# Patient Record
Sex: FEMALE | Race: WHITE | Employment: FULL TIME | URBAN - METROPOLITAN AREA
[De-identification: names, ages, dates, MRNs, and addresses within clinical notes are randomized per-mention and may not be internally consistent; named-entity substitution may affect disease eponyms.]

---

## 2023-10-01 ENCOUNTER — APPOINTMENT (OUTPATIENT)
Dept: RADIOLOGY | Facility: HOSPITAL | Age: 33
DRG: 101 | End: 2023-10-01
Payer: COMMERCIAL

## 2023-10-01 ENCOUNTER — HOSPITAL ENCOUNTER (INPATIENT)
Facility: HOSPITAL | Age: 33
LOS: 1 days | Discharge: HOME | DRG: 101 | End: 2023-10-02
Attending: STUDENT IN AN ORGANIZED HEALTH CARE EDUCATION/TRAINING PROGRAM | Admitting: INTERNAL MEDICINE
Payer: COMMERCIAL

## 2023-10-01 DIAGNOSIS — R56.9 SEIZURE (MULTI): Primary | ICD-10-CM

## 2023-10-01 PROBLEM — R79.89 HIGH SERUM LACTATE: Status: ACTIVE | Noted: 2023-10-01

## 2023-10-01 PROBLEM — R73.9 HYPERGLYCEMIA: Status: ACTIVE | Noted: 2023-10-01

## 2023-10-01 PROBLEM — E66.01 CLASS 3 SEVERE OBESITY WITHOUT SERIOUS COMORBIDITY WITH BODY MASS INDEX (BMI) OF 40.0 TO 44.9 IN ADULT (MULTI): Status: ACTIVE | Noted: 2023-10-01

## 2023-10-01 PROBLEM — E66.01 CLASS 3 SEVERE OBESITY WITHOUT SERIOUS COMORBIDITY WITH BODY MASS INDEX (BMI) OF 40.0 TO 44.9 IN ADULT (MULTI): Status: RESOLVED | Noted: 2023-10-01 | Resolved: 2023-10-01

## 2023-10-01 LAB
ALBUMIN SERPL BCP-MCNC: 4.2 G/DL (ref 3.4–5)
ALP SERPL-CCNC: 82 U/L (ref 33–110)
ALT SERPL W P-5'-P-CCNC: 14 U/L (ref 7–45)
AMPHETAMINES UR QL SCN: NORMAL
ANION GAP SERPL CALC-SCNC: 17 MMOL/L (ref 10–20)
AST SERPL W P-5'-P-CCNC: 13 U/L (ref 9–39)
B-HCG SERPL-ACNC: <2 MIU/ML
BARBITURATES UR QL SCN: NORMAL
BASOPHILS # BLD AUTO: 0.05 X10*3/UL (ref 0–0.1)
BASOPHILS NFR BLD AUTO: 0.7 %
BENZODIAZ UR QL SCN: NORMAL
BILIRUB SERPL-MCNC: 0.5 MG/DL (ref 0–1.2)
BUN SERPL-MCNC: 8 MG/DL (ref 6–23)
BZE UR QL SCN: NORMAL
CALCIUM SERPL-MCNC: 8.9 MG/DL (ref 8.6–10.3)
CANNABINOIDS UR QL SCN: NORMAL
CHLORIDE SERPL-SCNC: 105 MMOL/L (ref 98–107)
CO2 SERPL-SCNC: 20 MMOL/L (ref 21–32)
CREAT SERPL-MCNC: 0.92 MG/DL (ref 0.5–1.05)
EOSINOPHIL # BLD AUTO: 0.15 X10*3/UL (ref 0–0.7)
EOSINOPHIL NFR BLD AUTO: 2 %
ERYTHROCYTE [DISTWIDTH] IN BLOOD BY AUTOMATED COUNT: 15.6 % (ref 11.5–14.5)
FENTANYL+NORFENTANYL UR QL SCN: NORMAL
GFR SERPL CREATININE-BSD FRML MDRD: 86 ML/MIN/1.73M*2
GLUCOSE SERPL-MCNC: 129 MG/DL (ref 74–99)
HCG UR QL IA.RAPID: NEGATIVE
HCT VFR BLD AUTO: 42.5 % (ref 36–46)
HGB BLD-MCNC: 14.4 G/DL (ref 12–16)
IMM GRANULOCYTES # BLD AUTO: 0.02 X10*3/UL (ref 0–0.7)
IMM GRANULOCYTES NFR BLD AUTO: 0.3 % (ref 0–0.9)
LACTATE SERPL-SCNC: 0.7 MMOL/L (ref 0.4–2)
LACTATE SERPL-SCNC: 11.5 MMOL/L (ref 0.4–2)
LACTATE SERPL-SCNC: 5.9 MMOL/L (ref 0.4–2)
LYMPHOCYTES # BLD AUTO: 2.74 X10*3/UL (ref 1.2–4.8)
LYMPHOCYTES NFR BLD AUTO: 36.6 %
MCH RBC QN AUTO: 29.6 PG (ref 26–34)
MCHC RBC AUTO-ENTMCNC: 33.9 G/DL (ref 32–36)
MCV RBC AUTO: 87 FL (ref 80–100)
MONOCYTES # BLD AUTO: 0.63 X10*3/UL (ref 0.1–1)
MONOCYTES NFR BLD AUTO: 8.4 %
NEUTROPHILS # BLD AUTO: 3.89 X10*3/UL (ref 1.2–7.7)
NEUTROPHILS NFR BLD AUTO: 52 %
NRBC BLD-RTO: 0 /100 WBCS (ref 0–0)
OPIATES UR QL SCN: NORMAL
OXYCODONE+OXYMORPHONE UR QL SCN: NORMAL
PCP UR QL SCN: NORMAL
PLATELET # BLD AUTO: 283 X10*3/UL (ref 150–450)
PMV BLD AUTO: 9.7 FL (ref 7.5–11.5)
POTASSIUM SERPL-SCNC: 4 MMOL/L (ref 3.5–5.3)
PROT SERPL-MCNC: 7.4 G/DL (ref 6.4–8.2)
RBC # BLD AUTO: 4.87 X10*6/UL (ref 4–5.2)
SODIUM SERPL-SCNC: 138 MMOL/L (ref 136–145)
WBC # BLD AUTO: 7.5 X10*3/UL (ref 4.4–11.3)

## 2023-10-01 PROCEDURE — 2500000004 HC RX 250 GENERAL PHARMACY W/ HCPCS (ALT 636 FOR OP/ED): Performed by: INTERNAL MEDICINE

## 2023-10-01 PROCEDURE — 1200000002 HC GENERAL ROOM WITH TELEMETRY DAILY

## 2023-10-01 PROCEDURE — G1004 CDSM NDSC: HCPCS

## 2023-10-01 PROCEDURE — 70553 MRI BRAIN STEM W/O & W/DYE: CPT | Performed by: RADIOLOGY

## 2023-10-01 PROCEDURE — 83605 ASSAY OF LACTIC ACID: CPT | Performed by: STUDENT IN AN ORGANIZED HEALTH CARE EDUCATION/TRAINING PROGRAM

## 2023-10-01 PROCEDURE — 99285 EMERGENCY DEPT VISIT HI MDM: CPT | Performed by: STUDENT IN AN ORGANIZED HEALTH CARE EDUCATION/TRAINING PROGRAM

## 2023-10-01 PROCEDURE — 36415 COLL VENOUS BLD VENIPUNCTURE: CPT | Performed by: STUDENT IN AN ORGANIZED HEALTH CARE EDUCATION/TRAINING PROGRAM

## 2023-10-01 PROCEDURE — 2060000001 HC INTERMEDIATE ICU ROOM DAILY

## 2023-10-01 PROCEDURE — 70450 CT HEAD/BRAIN W/O DYE: CPT | Performed by: RADIOLOGY

## 2023-10-01 PROCEDURE — 84702 CHORIONIC GONADOTROPIN TEST: CPT | Performed by: STUDENT IN AN ORGANIZED HEALTH CARE EDUCATION/TRAINING PROGRAM

## 2023-10-01 PROCEDURE — 85025 COMPLETE CBC W/AUTO DIFF WBC: CPT | Performed by: STUDENT IN AN ORGANIZED HEALTH CARE EDUCATION/TRAINING PROGRAM

## 2023-10-01 PROCEDURE — 80307 DRUG TEST PRSMV CHEM ANLYZR: CPT | Performed by: STUDENT IN AN ORGANIZED HEALTH CARE EDUCATION/TRAINING PROGRAM

## 2023-10-01 PROCEDURE — 80053 COMPREHEN METABOLIC PANEL: CPT | Performed by: STUDENT IN AN ORGANIZED HEALTH CARE EDUCATION/TRAINING PROGRAM

## 2023-10-01 PROCEDURE — 2500000004 HC RX 250 GENERAL PHARMACY W/ HCPCS (ALT 636 FOR OP/ED): Performed by: STUDENT IN AN ORGANIZED HEALTH CARE EDUCATION/TRAINING PROGRAM

## 2023-10-01 PROCEDURE — 99223 1ST HOSP IP/OBS HIGH 75: CPT | Performed by: INTERNAL MEDICINE

## 2023-10-01 PROCEDURE — 96374 THER/PROPH/DIAG INJ IV PUSH: CPT

## 2023-10-01 PROCEDURE — 81025 URINE PREGNANCY TEST: CPT | Performed by: STUDENT IN AN ORGANIZED HEALTH CARE EDUCATION/TRAINING PROGRAM

## 2023-10-01 RX ORDER — DESOGESTREL AND ETHINYL ESTRADIOL 0.15-0.03
1 KIT ORAL DAILY
COMMUNITY

## 2023-10-01 RX ORDER — LEVETIRACETAM 500 MG/1
1000 TABLET ORAL 2 TIMES DAILY
Status: DISCONTINUED | OUTPATIENT
Start: 2023-10-02 | End: 2023-10-02

## 2023-10-01 RX ORDER — TALC
9 POWDER (GRAM) TOPICAL NIGHTLY PRN
Status: DISCONTINUED | OUTPATIENT
Start: 2023-10-01 | End: 2023-10-02 | Stop reason: HOSPADM

## 2023-10-01 RX ORDER — ONDANSETRON HYDROCHLORIDE 2 MG/ML
4 INJECTION, SOLUTION INTRAVENOUS EVERY 6 HOURS PRN
Status: DISCONTINUED | OUTPATIENT
Start: 2023-10-01 | End: 2023-10-02 | Stop reason: HOSPADM

## 2023-10-01 RX ORDER — SODIUM CHLORIDE 9 MG/ML
100 INJECTION, SOLUTION INTRAVENOUS CONTINUOUS
Status: DISCONTINUED | OUTPATIENT
Start: 2023-10-01 | End: 2023-10-02

## 2023-10-01 RX ORDER — ENOXAPARIN SODIUM 100 MG/ML
40 INJECTION SUBCUTANEOUS EVERY 12 HOURS SCHEDULED
Status: DISCONTINUED | OUTPATIENT
Start: 2023-10-01 | End: 2023-10-02 | Stop reason: HOSPADM

## 2023-10-01 RX ORDER — LORAZEPAM 2 MG/ML
INJECTION INTRAMUSCULAR
Status: DISPENSED
Start: 2023-10-01 | End: 2023-10-02

## 2023-10-01 RX ORDER — SODIUM CHLORIDE 0.9 % (FLUSH) 0.9 %
SYRINGE (ML) INJECTION
Status: COMPLETED
Start: 2023-10-01 | End: 2023-10-01

## 2023-10-01 RX ORDER — LEVETIRACETAM 500 MG/1
500 TABLET ORAL 2 TIMES DAILY
Status: DISCONTINUED | OUTPATIENT
Start: 2023-10-02 | End: 2023-10-01

## 2023-10-01 RX ORDER — ACETAMINOPHEN 325 MG/1
650 TABLET ORAL EVERY 4 HOURS PRN
Status: DISCONTINUED | OUTPATIENT
Start: 2023-10-01 | End: 2023-10-02 | Stop reason: HOSPADM

## 2023-10-01 RX ADMIN — SODIUM CHLORIDE 100 ML/HR: 9 INJECTION, SOLUTION INTRAVENOUS at 21:52

## 2023-10-01 RX ADMIN — Medication: at 22:00

## 2023-10-01 RX ADMIN — ENOXAPARIN SODIUM 40 MG: 40 INJECTION SUBCUTANEOUS at 21:52

## 2023-10-01 RX ADMIN — LEVETIRACETAM 3000 MG: 500 INJECTION, SOLUTION, CONCENTRATE INTRAVENOUS at 16:36

## 2023-10-01 SDOH — SOCIAL STABILITY: SOCIAL INSECURITY: DO YOU FEEL UNSAFE GOING BACK TO THE PLACE WHERE YOU ARE LIVING?: NO

## 2023-10-01 SDOH — SOCIAL STABILITY: SOCIAL INSECURITY: HAVE YOU HAD THOUGHTS OF HARMING ANYONE ELSE?: NO

## 2023-10-01 SDOH — SOCIAL STABILITY: SOCIAL INSECURITY: DOES ANYONE TRY TO KEEP YOU FROM HAVING/CONTACTING OTHER FRIENDS OR DOING THINGS OUTSIDE YOUR HOME?: NO

## 2023-10-01 SDOH — SOCIAL STABILITY: SOCIAL INSECURITY: ABUSE: ADULT

## 2023-10-01 SDOH — ECONOMIC STABILITY: TRANSPORTATION INSECURITY
IN THE PAST 12 MONTHS, HAS LACK OF TRANSPORTATION KEPT YOU FROM MEETINGS, WORK, OR FROM GETTING THINGS NEEDED FOR DAILY LIVING?: NO

## 2023-10-01 SDOH — HEALTH STABILITY: PHYSICAL HEALTH: ON AVERAGE, HOW MANY MINUTES DO YOU ENGAGE IN EXERCISE AT THIS LEVEL?: 20 MIN

## 2023-10-01 SDOH — SOCIAL STABILITY: SOCIAL INSECURITY: DO YOU FEEL ANYONE HAS EXPLOITED OR TAKEN ADVANTAGE OF YOU FINANCIALLY OR OF YOUR PERSONAL PROPERTY?: NO

## 2023-10-01 SDOH — SOCIAL STABILITY: SOCIAL INSECURITY: HAS ANYONE EVER THREATENED TO HURT YOUR FAMILY OR YOUR PETS?: NO

## 2023-10-01 SDOH — HEALTH STABILITY: PHYSICAL HEALTH: ON AVERAGE, HOW MANY DAYS PER WEEK DO YOU ENGAGE IN MODERATE TO STRENUOUS EXERCISE (LIKE A BRISK WALK)?: 3 DAYS

## 2023-10-01 SDOH — ECONOMIC STABILITY: HOUSING INSECURITY
IN THE LAST 12 MONTHS, WAS THERE A TIME WHEN YOU DID NOT HAVE A STEADY PLACE TO SLEEP OR SLEPT IN A SHELTER (INCLUDING NOW)?: NO

## 2023-10-01 SDOH — SOCIAL STABILITY: SOCIAL INSECURITY: ARE THERE ANY APPARENT SIGNS OF INJURIES/BEHAVIORS THAT COULD BE RELATED TO ABUSE/NEGLECT?: NO

## 2023-10-01 SDOH — ECONOMIC STABILITY: INCOME INSECURITY: HOW HARD IS IT FOR YOU TO PAY FOR THE VERY BASICS LIKE FOOD, HOUSING, MEDICAL CARE, AND HEATING?: PATIENT DECLINED

## 2023-10-01 SDOH — ECONOMIC STABILITY: INCOME INSECURITY: IN THE LAST 12 MONTHS, WAS THERE A TIME WHEN YOU WERE NOT ABLE TO PAY THE MORTGAGE OR RENT ON TIME?: NO

## 2023-10-01 SDOH — SOCIAL STABILITY: SOCIAL INSECURITY: WERE YOU ABLE TO COMPLETE ALL THE BEHAVIORAL HEALTH SCREENINGS?: YES

## 2023-10-01 SDOH — ECONOMIC STABILITY: TRANSPORTATION INSECURITY
IN THE PAST 12 MONTHS, HAS THE LACK OF TRANSPORTATION KEPT YOU FROM MEDICAL APPOINTMENTS OR FROM GETTING MEDICATIONS?: NO

## 2023-10-01 SDOH — SOCIAL STABILITY: SOCIAL INSECURITY: ARE YOU OR HAVE YOU BEEN THREATENED OR ABUSED PHYSICALLY, EMOTIONALLY, OR SEXUALLY BY ANYONE?: YES

## 2023-10-01 SDOH — SOCIAL STABILITY: SOCIAL INSECURITY: POSSIBLE ABUSE REPORTED TO:: OTHER (COMMENT)

## 2023-10-01 ASSESSMENT — PATIENT HEALTH QUESTIONNAIRE - PHQ9
SUM OF ALL RESPONSES TO PHQ9 QUESTIONS 1 & 2: 0
2. FEELING DOWN, DEPRESSED OR HOPELESS: NOT AT ALL
1. LITTLE INTEREST OR PLEASURE IN DOING THINGS: NOT AT ALL

## 2023-10-01 ASSESSMENT — ACTIVITIES OF DAILY LIVING (ADL)
HEARING - LEFT EAR: FUNCTIONAL
FEEDING YOURSELF: INDEPENDENT
JUDGMENT_ADEQUATE_SAFELY_COMPLETE_DAILY_ACTIVITIES: YES
ADEQUATE_TO_COMPLETE_ADL: YES
HEARING - RIGHT EAR: FUNCTIONAL
ADEQUATE_TO_COMPLETE_ADL: YES
BATHING: INDEPENDENT
GROOMING: INDEPENDENT
TOILETING: INDEPENDENT
GROOMING: INDEPENDENT
BATHING: INDEPENDENT
FEEDING YOURSELF: INDEPENDENT
PATIENT'S MEMORY ADEQUATE TO SAFELY COMPLETE DAILY ACTIVITIES?: YES
PATIENT'S MEMORY ADEQUATE TO SAFELY COMPLETE DAILY ACTIVITIES?: YES
WALKS IN HOME: INDEPENDENT
JUDGMENT_ADEQUATE_SAFELY_COMPLETE_DAILY_ACTIVITIES: YES
TOILETING: INDEPENDENT
HEARING - RIGHT EAR: FUNCTIONAL
HEARING - LEFT EAR: FUNCTIONAL
DRESSING YOURSELF: INDEPENDENT
DRESSING YOURSELF: INDEPENDENT
WALKS IN HOME: INDEPENDENT

## 2023-10-01 ASSESSMENT — COGNITIVE AND FUNCTIONAL STATUS - GENERAL
PATIENT BASELINE BEDBOUND: NO
MOBILITY SCORE: 24
DAILY ACTIVITIY SCORE: 24

## 2023-10-01 ASSESSMENT — LIFESTYLE VARIABLES
HOW OFTEN DO YOU HAVE 6 OR MORE DRINKS ON ONE OCCASION: NEVER
HOW OFTEN DO YOU HAVE A DRINK CONTAINING ALCOHOL: NEVER
HOW MANY STANDARD DRINKS CONTAINING ALCOHOL DO YOU HAVE ON A TYPICAL DAY: PATIENT DOES NOT DRINK
AUDIT-C TOTAL SCORE: 0
SKIP TO QUESTIONS 9-10: 1
PRESCIPTION_ABUSE_PAST_12_MONTHS: NO
SUBSTANCE_ABUSE_PAST_12_MONTHS: NO
AUDIT-C TOTAL SCORE: 0

## 2023-10-01 ASSESSMENT — PAIN SCALES - GENERAL
PAINLEVEL_OUTOF10: 0 - NO PAIN
PAINLEVEL_OUTOF10: 0 - NO PAIN

## 2023-10-01 ASSESSMENT — COLUMBIA-SUICIDE SEVERITY RATING SCALE - C-SSRS
6. HAVE YOU EVER DONE ANYTHING, STARTED TO DO ANYTHING, OR PREPARED TO DO ANYTHING TO END YOUR LIFE?: NO
1. IN THE PAST MONTH, HAVE YOU WISHED YOU WERE DEAD OR WISHED YOU COULD GO TO SLEEP AND NOT WAKE UP?: NO
2. HAVE YOU ACTUALLY HAD ANY THOUGHTS OF KILLING YOURSELF?: NO
1. IN THE PAST MONTH, HAVE YOU WISHED YOU WERE DEAD OR WISHED YOU COULD GO TO SLEEP AND NOT WAKE UP?: NO
6. HAVE YOU EVER DONE ANYTHING, STARTED TO DO ANYTHING, OR PREPARED TO DO ANYTHING TO END YOUR LIFE?: NO
2. HAVE YOU ACTUALLY HAD ANY THOUGHTS OF KILLING YOURSELF?: NO

## 2023-10-01 ASSESSMENT — PAIN - FUNCTIONAL ASSESSMENT
PAIN_FUNCTIONAL_ASSESSMENT: 0-10
PAIN_FUNCTIONAL_ASSESSMENT: 0-10

## 2023-10-01 NOTE — ED PROVIDER NOTES
HPI:  32-year-old female who is presenting after seizure.  Per EMS, patient's family and patient were in the car when patient suddenly had full body shaking.  She then was very confused after.  EMS noted when they arrived she was tired and responsive only to pain however they noted she slowly started to return back to her baseline over a period of 10 to 15 minutes.  Patient is currently denying any pain.  Denies any chest pain, shortness of breath, abdominal pain, nausea or vomiting.  Patient states she has been feeling at her baseline prior to the seizure.  She denies any new or abnormal symptoms.  Patient does note that she has had episodes like this in the past.  She states she was previously evaluated with an MRI and EEG and was told there was no answer as to what was causing these seizures.  Patient notes she has never been on any antiepileptics.  She denies any recent fevers or illness.    Additional History Obtained from: Family     ------------------------------------------------------------------------------------------------------------------------------------------  Physical Exam:    VS: As documented in the triage note and EMR flowsheet from this visit were reviewed.  General: Well appearing. No acute distress.   Eyes: Pupils round and reactive. No scleral icterus.   HENT: Atraumatic. Normocephalic. Moist mucous membranes.   CV: Regular rate. No pedal edema appreciated.  Resp: Clear to auscultation bilaterally. Non-labored.    GI: Soft, nontender to palpation. Nondistended.   Skin: Warm, dry, intact. No systemic rashes or lesions appreciated.  Neuro: Alert. No focal neuro deficits observed. Speech fluent. Answers questions appropriately.   Psych: Appropriate. Kempt.    ------------------------------------------------------------------------------------------------------------------------------------------    Medical Decision Making:  Patient is a 32-year-old female who is presenting after a witnessed  seizure-like activity.  Patient is mildly tachycardic but otherwise hemodynamically stable, afebrile, nontoxic-appearing on presentation.  She is currently asymptomatic and appears at her neurologic baseline.  She has no focal neurologic deficits on exam and there is low suspicion for stroke or ICH.  However patient may have intracranial mass causing her seizure.  Patient does note that she has had similar episodes in the past and it may be that she has underlying seizure disorder that has never been diagnosed.  Other considerations include infectious process however this is less likely given the patient is afebrile and denies any infectious symptoms.  She has no neck pain, fever, recent illnesses.  Lab work will be ordered as well as UA and chest x-ray.  CT head will be ordered to rule out intracranial mass.  I was subsequently notified by the nurse that the patient was actively having a seizure.  On my evaluation, patient had full body tonic-clonic movements and was biting her tongue.  She desatted to about 82% and her heart rate was 150.  Patient was unresponsive during this time.  Seizure lasted about 2 minutes and aborted on its own.  Patient was subsequently loaded with 3 g of Keppra.  After approximately 20 minutes she returned to her baseline. Patient's CBC was unremarkable.  CMP did show a mildly decreased bicarb at 21.  Lactate was 11.5 which is consistent with seizure activity.  Beta quant was negative.  CT head showed no acute findings.  Patient remained stable and Dr. Mosher from neurology was consulted who recommended admission for MRI and EEG.  Findings and plan were discussed with the patient patient questions were answered.  Patient was admitted to the hospitalist for further treatment and management.    Independent Interpretation of Studies:  I independently interpreted CT head; No acute findings    Escalation of Care:  Appropriate for admission    Discussion of Management with Other Providers:  Dr. Mosher from neurology, admitting team       Dora Bruce MD  Emergency Medicine               Dora Bruce MD  10/02/23 9433

## 2023-10-01 NOTE — Clinical Note
Is the patient on isolation?: No   Do you expect the patient to require telemetry (informational-only for bed management): Yes

## 2023-10-02 ENCOUNTER — APPOINTMENT (OUTPATIENT)
Dept: NEUROLOGY | Facility: HOSPITAL | Age: 33
DRG: 101 | End: 2023-10-02
Payer: COMMERCIAL

## 2023-10-02 ENCOUNTER — PHARMACY VISIT (OUTPATIENT)
Dept: PHARMACY | Facility: CLINIC | Age: 33
End: 2023-10-02
Payer: COMMERCIAL

## 2023-10-02 VITALS
OXYGEN SATURATION: 97 % | HEIGHT: 63 IN | SYSTOLIC BLOOD PRESSURE: 122 MMHG | HEART RATE: 74 BPM | TEMPERATURE: 97.2 F | DIASTOLIC BLOOD PRESSURE: 77 MMHG | RESPIRATION RATE: 18 BRPM | WEIGHT: 257.06 LBS | BODY MASS INDEX: 45.55 KG/M2

## 2023-10-02 LAB
ALBUMIN SERPL BCP-MCNC: 2.9 G/DL (ref 3.4–5)
ANION GAP SERPL CALC-SCNC: 9 MMOL/L (ref 10–20)
BUN SERPL-MCNC: 4 MG/DL (ref 6–23)
CALCIUM SERPL-MCNC: 7.1 MG/DL (ref 8.6–10.3)
CHLORIDE SERPL-SCNC: 114 MMOL/L (ref 98–107)
CO2 SERPL-SCNC: 21 MMOL/L (ref 21–32)
CREAT SERPL-MCNC: 0.75 MG/DL (ref 0.5–1.05)
ERYTHROCYTE [DISTWIDTH] IN BLOOD BY AUTOMATED COUNT: 15.8 % (ref 11.5–14.5)
EST. AVERAGE GLUCOSE BLD GHB EST-MCNC: 94 MG/DL
GFR SERPL CREATININE-BSD FRML MDRD: >90 ML/MIN/1.73M*2
GLUCOSE SERPL-MCNC: 78 MG/DL (ref 74–99)
HBA1C MFR BLD: 4.9 %
HCT VFR BLD AUTO: 36.6 % (ref 36–46)
HGB BLD-MCNC: 12.4 G/DL (ref 12–16)
MAGNESIUM SERPL-MCNC: 1.58 MG/DL (ref 1.6–2.4)
MCH RBC QN AUTO: 29.2 PG (ref 26–34)
MCHC RBC AUTO-ENTMCNC: 33.9 G/DL (ref 32–36)
MCV RBC AUTO: 86 FL (ref 80–100)
NRBC BLD-RTO: 0 /100 WBCS (ref 0–0)
PHOSPHATE SERPL-MCNC: 2.6 MG/DL (ref 2.5–4.9)
PLATELET # BLD AUTO: 220 X10*3/UL (ref 150–450)
PMV BLD AUTO: 9.4 FL (ref 7.5–11.5)
POTASSIUM SERPL-SCNC: 3.2 MMOL/L (ref 3.5–5.3)
RBC # BLD AUTO: 4.24 X10*6/UL (ref 4–5.2)
SODIUM SERPL-SCNC: 141 MMOL/L (ref 136–145)
WBC # BLD AUTO: 7.3 X10*3/UL (ref 4.4–11.3)

## 2023-10-02 PROCEDURE — 2500000004 HC RX 250 GENERAL PHARMACY W/ HCPCS (ALT 636 FOR OP/ED): Performed by: INTERNAL MEDICINE

## 2023-10-02 PROCEDURE — 36415 COLL VENOUS BLD VENIPUNCTURE: CPT | Performed by: INTERNAL MEDICINE

## 2023-10-02 PROCEDURE — 95819 EEG AWAKE AND ASLEEP: CPT

## 2023-10-02 PROCEDURE — 80069 RENAL FUNCTION PANEL: CPT | Performed by: INTERNAL MEDICINE

## 2023-10-02 PROCEDURE — 85027 COMPLETE CBC AUTOMATED: CPT | Performed by: INTERNAL MEDICINE

## 2023-10-02 PROCEDURE — 83735 ASSAY OF MAGNESIUM: CPT | Performed by: INTERNAL MEDICINE

## 2023-10-02 PROCEDURE — 99239 HOSP IP/OBS DSCHRG MGMT >30: CPT | Performed by: INTERNAL MEDICINE

## 2023-10-02 PROCEDURE — 83036 HEMOGLOBIN GLYCOSYLATED A1C: CPT | Performed by: INTERNAL MEDICINE

## 2023-10-02 PROCEDURE — 99223 1ST HOSP IP/OBS HIGH 75: CPT | Performed by: PSYCHIATRY & NEUROLOGY

## 2023-10-02 PROCEDURE — RXMED WILLOW AMBULATORY MEDICATION CHARGE

## 2023-10-02 PROCEDURE — 2500000001 HC RX 250 WO HCPCS SELF ADMINISTERED DRUGS (ALT 637 FOR MEDICARE OP): Performed by: INTERNAL MEDICINE

## 2023-10-02 PROCEDURE — A9575 INJ GADOTERATE MEGLUMI 0.1ML: HCPCS | Performed by: INTERNAL MEDICINE

## 2023-10-02 RX ORDER — LEVETIRACETAM 750 MG/1
750 TABLET ORAL 2 TIMES DAILY
Qty: 60 TABLET | Refills: 5 | Status: SHIPPED | OUTPATIENT
Start: 2023-10-02 | End: 2024-03-23 | Stop reason: SDUPTHER

## 2023-10-02 RX ADMIN — ENOXAPARIN SODIUM 40 MG: 40 INJECTION SUBCUTANEOUS at 09:18

## 2023-10-02 RX ADMIN — GADOTERATE MEGLUMINE 20 ML: 376.9 INJECTION INTRAVENOUS at 10:28

## 2023-10-02 RX ADMIN — LEVETIRACETAM 1000 MG: 500 TABLET, FILM COATED ORAL at 09:18

## 2023-10-02 ASSESSMENT — COGNITIVE AND FUNCTIONAL STATUS - GENERAL
MOBILITY SCORE: 24
DAILY ACTIVITIY SCORE: 24

## 2023-10-02 ASSESSMENT — PAIN SCALES - GENERAL: PAINLEVEL_OUTOF10: 0 - NO PAIN

## 2023-10-02 ASSESSMENT — PAIN - FUNCTIONAL ASSESSMENT: PAIN_FUNCTIONAL_ASSESSMENT: 0-10

## 2023-10-02 NOTE — CARE PLAN
The patient's goals for the shift include No seizure activity.    The clinical goals for the shift include No seizure activity during hospital stay    Over the shift, the patient did not make progress toward the following goals. Barriers to progression include n/a. Recommendations to address these barriers include n/a.

## 2023-10-02 NOTE — ASSESSMENT & PLAN NOTE
Lactate = 5.9 --> 11.5 --> 0.7 -->  Will continue with gentle fluids overnight and reassess in the morning

## 2023-10-02 NOTE — PROGRESS NOTES
"History Of Present Illness  Annabelle Antony is a 32 y.o. female presenting with seizure activity.    Patient reported she was a passenger in a car (her mom was driving) on 10/1/2023 to  food when she was reported to have 2 separate seizure-like activity in the car.  She had no recollection of the events.  She had loss of consciousness.  She apparently started with staring off, and had generalized shaking of the entire body.  EMS was called, patient appeared postictal, and was minimally responsive.  She was brought to [facility] ED for evaluation.  In the ED, she had another witnessed event, which was documented to be generalized tonic-clonic movements with biting of the tongue, and no incontinence.  Patient was noted to be postictal for a few minutes and was back to baseline mentation.  Patient was given levetiracetam (per documentation 3 g), and was subsequently started on levetiracetam 1000 mg twice a day.  Head CT without contrast done was unremarkable.  UDS was negative.  Lactate was elevated, which later trended down.  Patient admitted to the hospital, neurology consulted.    No known significant past medical history.  However, has had some suspected seizure-like activity in the past consisting mostly of staring off episodes when she was younger (apparently started when she was a baby).  Her  also noted that during sleep, she sometimes would have \"shaking episodes\".    She only takes Apri (birth control).  She is , but has no plans of having kids.    She thinks she was born full-term (\"was born late\").  Denies any known fetomaternal complications during pregnancy.  In school, she was \"average\" student.    She believes she previously was checked for reasons for why she injured her ankle a few years back some rest.  She believes she may have had head imaging done, but no EEG.  \"Nothing\" ever came out of that.    Nobody had previously diagnosed her as having seizure disorder.  She has never been " "on seizure medication before.    She admits to some anxiety. Pt holding a stuffed animal in bed.    Pt states nothing out of the ordinary. No new meds. She did just start working at a [employer] about 1 mo ago in [place], [place].    She denies tobacco use/alcohol use/street drug use.    Inpatient consult to Neurology  Consult performed by: Christopher Gamino MD  Consult ordered by: Agustin Manzano DO        Past Medical History  History reviewed. No pertinent past medical history.    Surgical History  Past Surgical History:   Procedure Laterality Date    ANKLE SURGERY Left     witih metal placed in it in June 2022       Social History  Social History     Tobacco Use    Smoking status: Never    Smokeless tobacco: Never   Vaping Use    Vaping Use: Never used   Substance Use Topics    Alcohol use: Never    Drug use: Never       Allergies  Patient has no known allergies.  Medications Prior to Admission   Medication Sig Dispense Refill Last Dose    desogestreL-ethinyl estradioL (Apri) 0.15-0.03 mg tablet Take 1 tablet by mouth once daily.          Review of Systems   Constitutional:  Negative for appetite change, chills and fever.   HENT:  Negative for ear pain and nosebleeds.    Eyes:  Negative for discharge and itching.   Respiratory:  Negative for choking and chest tightness.    Cardiovascular:  Negative for chest pain and palpitations.   Gastrointestinal:  Negative for abdominal distention, abdominal pain and nausea.   Endocrine: Negative for cold intolerance and heat intolerance.   Genitourinary:  Negative for difficulty urinating and dysuria.   Musculoskeletal:  Negative for gait problem and myalgias.   Neurological:  Negative for dizziness and numbness.     Last Recorded Vitals  Blood pressure 112/73, pulse 77, temperature 36.8 °C (98.2 °F), temperature source Temporal, resp. rate 18, height 1.6 m (5' 2.99\"), weight 117 kg (257 lb 0.9 oz), last menstrual period 09/13/2023, SpO2 95 %.    Awake, alert, oriented x3, in no " distress  Well-nourished, in bed  No leg edema    Supple neck    Mental status exam as above, conversant   Fair fund of knowledge  Recent/remote memory fair  Fair attention span  Pupils round reactive to light, 3-4 mm, (-) RAPD   Fundoscopic examination was attempted but fundus was not visualized bilaterally   Full EOMs intact, no nystagmus, no ptosis   V1 to V3 sensation is intact   No facial droop   Hearing grossly intact   No dysarthria  Good shoulder shrug bilaterally   Tongue is midline     Motor strength is 5/5 on all extremities, tone/bulk normal   Reflexes trace-1+ on all 4 extremities, downgoing toes bilaterally   Sensation is intact to light touch, vibration on all 4 extremities   Finger to nose test intact bilaterally   I did not have her stand or walk    Relevant Results            Scheduled medications  enoxaparin, 40 mg, subcutaneous, q12h RONALD  levETIRAcetam, 1,000 mg, oral, BID      Continuous medications  sodium chloride 0.9%, 100 mL/hr, Last Rate: 100 mL/hr (10/02/23 1022)      PRN medications  PRN medications: acetaminophen, melatonin, ondansetron    Results for orders placed or performed during the hospital encounter of 10/01/23 (from the past 24 hour(s))   CBC and Auto Differential   Result Value Ref Range    WBC 7.5 4.4 - 11.3 x10*3/uL    nRBC 0.0 0.0 - 0.0 /100 WBCs    RBC 4.87 4.00 - 5.20 x10*6/uL    Hemoglobin 14.4 12.0 - 16.0 g/dL    Hematocrit 42.5 36.0 - 46.0 %    MCV 87 80 - 100 fL    MCH 29.6 26.0 - 34.0 pg    MCHC 33.9 32.0 - 36.0 g/dL    RDW 15.6 (H) 11.5 - 14.5 %    Platelets 283 150 - 450 x10*3/uL    MPV 9.7 7.5 - 11.5 fL    Neutrophils % 52.0 40.0 - 80.0 %    Immature Granulocytes %, Automated 0.3 0.0 - 0.9 %    Lymphocytes % 36.6 13.0 - 44.0 %    Monocytes % 8.4 2.0 - 10.0 %    Eosinophils % 2.0 0.0 - 6.0 %    Basophils % 0.7 0.0 - 2.0 %    Neutrophils Absolute 3.89 1.20 - 7.70 x10*3/uL    Immature Granulocytes Absolute, Automated 0.02 0.00 - 0.70 x10*3/uL    Lymphocytes Absolute  2.74 1.20 - 4.80 x10*3/uL    Monocytes Absolute 0.63 0.10 - 1.00 x10*3/uL    Eosinophils Absolute 0.15 0.00 - 0.70 x10*3/uL    Basophils Absolute 0.05 0.00 - 0.10 x10*3/uL   Comprehensive metabolic panel   Result Value Ref Range    Glucose 129 (H) 74 - 99 mg/dL    Sodium 138 136 - 145 mmol/L    Potassium 4.0 3.5 - 5.3 mmol/L    Chloride 105 98 - 107 mmol/L    Bicarbonate 20 (L) 21 - 32 mmol/L    Anion Gap 17 10 - 20 mmol/L    Urea Nitrogen 8 6 - 23 mg/dL    Creatinine 0.92 0.50 - 1.05 mg/dL    eGFR 86 >60 mL/min/1.73m*2    Calcium 8.9 8.6 - 10.3 mg/dL    Albumin 4.2 3.4 - 5.0 g/dL    Alkaline Phosphatase 82 33 - 110 U/L    Total Protein 7.4 6.4 - 8.2 g/dL    AST 13 9 - 39 U/L    Bilirubin, Total 0.5 0.0 - 1.2 mg/dL    ALT 14 7 - 45 U/L   Lactate   Result Value Ref Range    Lactate 5.9 (HH) 0.4 - 2.0 mmol/L   hCG, quantitative, pregnancy   Result Value Ref Range    HCG, Beta-Quantitative <2 <5 mIU/mL   Lactate   Result Value Ref Range    Lactate 11.5 (HH) 0.4 - 2.0 mmol/L   hCG, Urine, Qualitative   Result Value Ref Range    HCG, Urine NEGATIVE NEGATIVE   Drug Screen, Urine   Result Value Ref Range    Amphetamine Screen, Urine Presumptive Negative Presumptive Negative    Barbiturate Screen, Urine Presumptive Negative Presumptive Negative    Benzodiazepines Screen, Urine Presumptive Negative Presumptive Negative    Cannabinoid Screen, Urine Presumptive Negative Presumptive Negative    Cocaine Metabolite Screen, Urine Presumptive Negative Presumptive Negative    Fentanyl Screen, Urine Presumptive Negative Presumptive Negative    Opiate Screen, Urine Presumptive Negative Presumptive Negative    Oxycodone Screen, Urine Presumptive Negative Presumptive Negative    PCP Screen, Urine Presumptive Negative Presumptive Negative   Lactate   Result Value Ref Range    Lactate 0.7 0.4 - 2.0 mmol/L   CBC   Result Value Ref Range    WBC 7.3 4.4 - 11.3 x10*3/uL    nRBC 0.0 0.0 - 0.0 /100 WBCs    RBC 4.24 4.00 - 5.20 x10*6/uL     Hemoglobin 12.4 12.0 - 16.0 g/dL    Hematocrit 36.6 36.0 - 46.0 %    MCV 86 80 - 100 fL    MCH 29.2 26.0 - 34.0 pg    MCHC 33.9 32.0 - 36.0 g/dL    RDW 15.8 (H) 11.5 - 14.5 %    Platelets 220 150 - 450 x10*3/uL    MPV 9.4 7.5 - 11.5 fL   Renal function panel   Result Value Ref Range    Glucose 78 74 - 99 mg/dL    Sodium 141 136 - 145 mmol/L    Potassium 3.2 (L) 3.5 - 5.3 mmol/L    Chloride 114 (H) 98 - 107 mmol/L    Bicarbonate 21 21 - 32 mmol/L    Anion Gap 9 (L) 10 - 20 mmol/L    Urea Nitrogen 4 (L) 6 - 23 mg/dL    Creatinine 0.75 0.50 - 1.05 mg/dL    eGFR >90 >60 mL/min/1.73m*2    Calcium 7.1 (L) 8.6 - 10.3 mg/dL    Phosphorus 2.6 2.5 - 4.9 mg/dL    Albumin 2.9 (L) 3.4 - 5.0 g/dL   Magnesium   Result Value Ref Range    Magnesium 1.58 (L) 1.60 - 2.40 mg/dL   Hemoglobin A1c   Result Value Ref Range    Hemoglobin A1C 4.9 see below %    Estimated Average Glucose 94 Not Established mg/dL     MR brain w and wo IV contrast    Result Date: 10/2/2023  Interpreted By:  Harsha Ann, STUDY: MR BRAIN W AND WO IV CONTRAST;  10/2/2023 10:54 am   INDICATION: Signs/Symptoms:Seizure.   COMPARISON: None.   ACCESSION NUMBER(S): [id number]   ORDERING CLINICIAN: BO KENNEY   TECHNIQUE: The brain was studied in the sagittal, axial and coronal planes utilizing FLAIR, T1 and T2 weighted images.    Following intravenous injection of gadolinium contrast, T1 weighted fat suppressed multiplanar images were also performed. Postcontrast enhanced images are limited due to motion artifact.   FINDINGS: There is a normal-size ventricular system.  There is no evidence of intracranial mass or extra-axial collection.  The skull base, paranasal sinuses and orbital structures are unremarkable. Diffusion weighted images and associated ADC maps of the brain were unremarkable.  There is no evidence of diffusion restriction to suggest the presence of acute infarction. Gradient echo T2 weighted images fail to demonstrate hemosiderin deposition or  other evidence of hemorrhage. Following intravenous injection of there is no abnormal enhancement. There is normal contrast opacification of the dural venous sinuses.  Thin section sagittal and coronal T1 weighted images of the brain were also performed with 3-D reconstruction in multiple planes.  There is no evidence of neuronal migrational abnormality or heterotopic gray matter.  The temporal horns and temporal lobe gray matter are unremarkable.       * There is no evidence of mass, infarction or hemorrhage.   THIS EXAMINATION WAS INTERPRETED AT [facility]   Signed by: Harsha Ann 10/2/2023 11:38 AM Dictation workstation:   [id number]    CT head wo IV contrast    Result Date: 10/1/2023  Interpreted By:  Troy Frank, STUDY: CT HEAD WO IV CONTRAST;  10/1/2023 5:57 pm   INDICATION: seizures.   COMPARISON: None..   ACCESSION NUMBER(S): [id number]   ORDERING CLINICIAN: ONEYDA RIOS   TECHNIQUE: CT axial images through the Brain were obtained without contrast.   FINDINGS: There is no mass effect, hemorrhage, or infarct. The ventricles appear normal. Gray-white differentiation is maintained. The visualized paranasal sinuses appear clear.       No acute intracranial abnormality.   MACRO: None.   Signed by: Troy Frank 10/1/2023 6:53 PM Dictation workstation:   [id number]                              I have personally reviewed the following imaging results MR brain w and wo IV contrast    Result Date: 10/2/2023  Interpreted By:  Harsha Ann, STUDY: MR BRAIN W AND WO IV CONTRAST;  10/2/2023 10:54 am   INDICATION: Signs/Symptoms:Seizure.   COMPARISON: None.   ACCESSION NUMBER(S): [id number]   ORDERING CLINICIAN: BO KENNEY   TECHNIQUE: The brain was studied in the sagittal, axial and coronal planes utilizing FLAIR, T1 and T2 weighted images.    Following intravenous injection of gadolinium contrast, T1 weighted fat suppressed multiplanar images were also performed. Postcontrast enhanced images are limited due  to motion artifact.   FINDINGS: There is a normal-size ventricular system.  There is no evidence of intracranial mass or extra-axial collection.  The skull base, paranasal sinuses and orbital structures are unremarkable. Diffusion weighted images and associated ADC maps of the brain were unremarkable.  There is no evidence of diffusion restriction to suggest the presence of acute infarction. Gradient echo T2 weighted images fail to demonstrate hemosiderin deposition or other evidence of hemorrhage. Following intravenous injection of there is no abnormal enhancement. There is normal contrast opacification of the dural venous sinuses.  Thin section sagittal and coronal T1 weighted images of the brain were also performed with 3-D reconstruction in multiple planes.  There is no evidence of neuronal migrational abnormality or heterotopic gray matter.  The temporal horns and temporal lobe gray matter are unremarkable.       * There is no evidence of mass, infarction or hemorrhage.   THIS EXAMINATION WAS INTERPRETED AT [facility]   Signed by: Harsha Ann 10/2/2023 11:38 AM Dictation workstation:   [id number]    CT head wo IV contrast    Result Date: 10/1/2023  Interpreted By:  Troy Frank, STUDY: CT HEAD WO IV CONTRAST;  10/1/2023 5:57 pm   INDICATION: seizures.   COMPARISON: None..   ACCESSION NUMBER(S): [id number]   ORDERING CLINICIAN: ONEYDA RIOS   TECHNIQUE: CT axial images through the Brain were obtained without contrast.   FINDINGS: There is no mass effect, hemorrhage, or infarct. The ventricles appear normal. Gray-white differentiation is maintained. The visualized paranasal sinuses appear clear.       No acute intracranial abnormality.   MACRO: None.   Signed by: Troy Frank 10/1/2023 6:53 PM Dictation workstation:   [id number]  .     Assessment/Plan   Principal Problem:    Seizure (CMS/[facility])      Plans:  Do EEG  Seizure precautions  Change levetiracetam dose to 750mg bid  Discussed no driving x6 mo from last  sz  Discussed, pt to discuss with workplace about new diagnosis if she wants     All questions answered. Please call with questions.    Christopher Gamino MD  10/2/2023  1:21 PM

## 2023-10-02 NOTE — ASSESSMENT & PLAN NOTE
New-onset convulsive seizure activity x3 10/1/23  With prior suspected reported staring episodes since a child  Discussed possible new diagnosis of underlying seizure disorder (possibly generalized)  MRI brain normal

## 2023-10-02 NOTE — SIGNIFICANT EVENT
EEG Final Interpretation 10/2/23    This is a normal awake and sleep EEG. No epileptiform abnormalities or lateralizing signs noted.

## 2023-10-02 NOTE — H&P
"History Of Present Illness  Annabelle Sherman Friend is a 32 y.o. male with no significant past medical history.  She had been noted patient does not have any personal or family history of seizure disorder or other neurological dysfunction.  She denies any history of traumatic events to her head.  She states that her mother had told her once that she would have staring off spells as a baby but this was very infrequent and was not worked up as a child.  Patient states that about 5 years ago her  had noted she would also still have staring off spells or what they thought were \"nightmares\" as this would occur only in the evening.  About 1 year ago when the patient fell and twisted her ankle she had had an episode of staring off when she tried to get up and a shaking episode where she would shake all over but came to very quickly which she states that she did not feel was worked up but is unsure and this was done [facility] she thinks.    Patient presented to the ED after a witnessed seizure events by family.  Per the ED physician family had stated that she had suddenly become unresponsive and was staring off at which point she fell to the ground and had generalized shaking of her entire body that had lasted maybe a few minutes but it is unsure exactly how long.  Family had called EMS but noted that the patient was postictal and minimally responsive but able to protect her airway and upon arrival to the ED she was noted to be back to baseline.  She did have a second witnessed event in the ED per the ED physician where she had generalized tonic-clonic movements as well as biting of the tongue but they did not note any loss of bowel/bladder control.  Patient was only postictal for a few minutes and then was back to her baseline mentation.  Patient states that she did not know she had a second event in the ED.  She denies any recent sick contacts, chemical/environmental exposures, changes in dietary habits or any recent " traumatic events/falls.  She denies any fevers, chills, night sweats, vision changes, auditory changes, change in taste/smell, loss of bowel/bladder control, mass, vertigo, chest pain, palpitations, shortness of breath, cough, wheezing, congestion, hemoptysis, hematemesis, abdominal pain, nausea, vomiting, diarrhea, constipation, dysuria, hematuria, dyschezia, hematochezia or any lateralizing motor/sensory deficits other than noted above.    ED course:  1.  Vital signs on presentation: Temperature of 36.7 °C, heart rate of 97, respirations 16, blood pressure 136/86, pulse ox of 99% on room air  2.  CBC was unremarkable for any leukocytosis/anemia  3.  Glucose of 129  4.  Serum beta-hCG of less than 2  5.  Initial lactate of 5.9 with a repeat of 11.5 repeat of 0.7  6.  BUNs/creatinine of 8/0.92  7.  Sodium of 138, potassium of 4  8.  Urine drug screen was unremarkable  9.  CT head without contrast was unremarkable for any acute intracranial processes    A 12 point ROS was performed with the patient denying any complaints at this time aside from those listed in the HPI above.      Surgical History  She has a past surgical history that includes Ankle surgery (Left).     Social History  She reports that she has never smoked. She has never used smokeless tobacco. She reports that she does not drink alcohol and does not use drugs.    Family History  No family history on file.     Allergies  Patient has no known allergies.    Physical Exam  Constitutional:       Appearance: Normal appearance. She is obese.   HENT:      Head: Normocephalic and atraumatic.      Mouth/Throat:      Mouth: Mucous membranes are moist.   Eyes:      Extraocular Movements: Extraocular movements intact.      Conjunctiva/sclera: Conjunctivae normal.      Pupils: Pupils are equal, round, and reactive to light.   Neck:      Vascular: No carotid bruit.   Cardiovascular:      Rate and Rhythm: Normal rate and regular rhythm.      Pulses: Normal pulses.       Heart sounds: Normal heart sounds. No murmur heard.  Pulmonary:      Effort: Pulmonary effort is normal. No respiratory distress.      Breath sounds: Normal breath sounds. No wheezing or rhonchi.   Chest:      Chest wall: No tenderness.   Abdominal:      General: Bowel sounds are normal. There is no distension.      Palpations: Abdomen is soft. There is no mass.      Tenderness: There is no abdominal tenderness. There is no right CVA tenderness, left CVA tenderness, guarding or rebound.   Musculoskeletal:         General: No swelling, deformity or signs of injury. Normal range of motion.      Cervical back: Normal range of motion and neck supple. No rigidity or tenderness.   Skin:     General: Skin is warm and dry.      Capillary Refill: Capillary refill takes less than 2 seconds.   Neurological:      General: No focal deficit present.      Mental Status: She is alert and oriented to person, place, and time.      Cranial Nerves: No cranial nerve deficit.      Sensory: No sensory deficit.      Motor: No weakness.      Gait: Gait normal.   Psychiatric:         Mood and Affect: Mood normal.         Behavior: Behavior normal.         Thought Content: Thought content normal.         Judgment: Judgment normal.     Last Recorded Vitals  /80 (BP Location: Right arm)   Pulse 87   Temp 36.5 °C (97.7 °F) (Axillary)   Resp 18   Wt 117 kg (257 lb 0.9 oz)   SpO2 97%     Relevant Results  Results for orders placed or performed during the hospital encounter of 10/01/23 (from the past 24 hour(s))   CBC and Auto Differential   Result Value Ref Range    WBC 7.5 4.4 - 11.3 x10*3/uL    nRBC 0.0 0.0 - 0.0 /100 WBCs    RBC 4.87 4.00 - 5.20 x10*6/uL    Hemoglobin 14.4 12.0 - 16.0 g/dL    Hematocrit 42.5 36.0 - 46.0 %    MCV 87 80 - 100 fL    MCH 29.6 26.0 - 34.0 pg    MCHC 33.9 32.0 - 36.0 g/dL    RDW 15.6 (H) 11.5 - 14.5 %    Platelets 283 150 - 450 x10*3/uL    MPV 9.7 7.5 - 11.5 fL    Neutrophils % 52.0 40.0 - 80.0 %     Immature Granulocytes %, Automated 0.3 0.0 - 0.9 %    Lymphocytes % 36.6 13.0 - 44.0 %    Monocytes % 8.4 2.0 - 10.0 %    Eosinophils % 2.0 0.0 - 6.0 %    Basophils % 0.7 0.0 - 2.0 %    Neutrophils Absolute 3.89 1.20 - 7.70 x10*3/uL    Immature Granulocytes Absolute, Automated 0.02 0.00 - 0.70 x10*3/uL    Lymphocytes Absolute 2.74 1.20 - 4.80 x10*3/uL    Monocytes Absolute 0.63 0.10 - 1.00 x10*3/uL    Eosinophils Absolute 0.15 0.00 - 0.70 x10*3/uL    Basophils Absolute 0.05 0.00 - 0.10 x10*3/uL   Comprehensive metabolic panel   Result Value Ref Range    Glucose 129 (H) 74 - 99 mg/dL    Sodium 138 136 - 145 mmol/L    Potassium 4.0 3.5 - 5.3 mmol/L    Chloride 105 98 - 107 mmol/L    Bicarbonate 20 (L) 21 - 32 mmol/L    Anion Gap 17 10 - 20 mmol/L    Urea Nitrogen 8 6 - 23 mg/dL    Creatinine 0.92 0.50 - 1.05 mg/dL    eGFR 86 >60 mL/min/1.73m*2    Calcium 8.9 8.6 - 10.3 mg/dL    Albumin 4.2 3.4 - 5.0 g/dL    Alkaline Phosphatase 82 33 - 110 U/L    Total Protein 7.4 6.4 - 8.2 g/dL    AST 13 9 - 39 U/L    Bilirubin, Total 0.5 0.0 - 1.2 mg/dL    ALT 14 7 - 45 U/L   Lactate   Result Value Ref Range    Lactate 5.9 (HH) 0.4 - 2.0 mmol/L   hCG, quantitative, pregnancy   Result Value Ref Range    HCG, Beta-Quantitative <2 <5 mIU/mL   Lactate   Result Value Ref Range    Lactate 11.5 (HH) 0.4 - 2.0 mmol/L   hCG, Urine, Qualitative   Result Value Ref Range    HCG, Urine NEGATIVE NEGATIVE   Drug Screen, Urine   Result Value Ref Range    Amphetamine Screen, Urine Presumptive Negative Presumptive Negative    Barbiturate Screen, Urine Presumptive Negative Presumptive Negative    Benzodiazepines Screen, Urine Presumptive Negative Presumptive Negative    Cannabinoid Screen, Urine Presumptive Negative Presumptive Negative    Cocaine Metabolite Screen, Urine Presumptive Negative Presumptive Negative    Fentanyl Screen, Urine Presumptive Negative Presumptive Negative    Opiate Screen, Urine Presumptive Negative Presumptive Negative     Oxycodone Screen, Urine Presumptive Negative Presumptive Negative    PCP Screen, Urine Presumptive Negative Presumptive Negative   Lactate   Result Value Ref Range    Lactate 0.7 0.4 - 2.0 mmol/L       CT head wo IV contrast    Result Date: 10/1/2023  Interpreted By:  Troy Frank, STUDY: CT HEAD WO IV CONTRAST;  10/1/2023 5:57 pm   INDICATION: seizures.   COMPARISON: None..   ACCESSION NUMBER(S): [id number]   ORDERING CLINICIAN: ONEYDA RIOS   TECHNIQUE: CT axial images through the Brain were obtained without contrast.   FINDINGS: There is no mass effect, hemorrhage, or infarct. The ventricles appear normal. Gray-white differentiation is maintained. The visualized paranasal sinuses appear clear.       No acute intracranial abnormality.   MACRO: None.   Signed by: Troy Frank 10/1/2023 6:53 PM Dictation workstation:   [id number]       Assessment/Plan     Class 3 severe obesity without serious comorbidity with body mass index (BMI) of 45.0 to 49.9 in adult (CMS/HCC)  Assessment & Plan  BMI = 45.4    High serum lactate  Assessment & Plan  Lactate = 5.9 --> 11.5 --> 0.7 -->  Will continue with gentle fluids overnight and reassess in the morning     Hyperglycemia  Assessment & Plan  Glucose = 129  Suspect in setting of seizure   Will check Hgb A1c and repeat glucose with AM labs    * Seizure (CMS/[facility])  Assessment & Plan  No acute process of imaging  No grossly evident source to lead to the above, but concern remains for an underlying seizure disorder given her prior history description   s/p 3gm keppra in the ED   Discussed with neurology whom recommend continuing keppra 1000mg po bid   Telemetry Monitoring  Seizure Precautions  EEG = pending  MR Brain w/o IV Contrast = pending  MR Angio Head/Neck w/o IV Contrast = pending  Neurology Consult appreciated             Agustin Manzano DO

## 2023-10-02 NOTE — DISCHARGE SUMMARY
Discharge Diagnosis  Seizure (CMS/HCC)    Issues Requiring Follow-Up  Patient is being started on Keppra.  Will need follow-up with neurology in approximately 4 weeks.    No driving x6 months.    Discharge Meds     Your medication list        START taking these medications        Instructions Last Dose Given Next Dose Due   levETIRAcetam 750 mg tablet  Commonly known as: Keppra      Take 1 tablet (750 mg) by mouth 2 times a day.              ASK your doctor about these medications        Instructions Last Dose Given Next Dose Due   desogestreL-ethinyl estradioL 0.15-0.03 mg tablet  Commonly known as: Apri                     Where to Get Your Medications        These medications were sent to [facility]  [address] [address]      Hours: 8AM to 6PM Mon-Fri, 8AM to 2PM Sat, 8AM to 12PM Sun Phone: [phone]   levETIRAcetam 750 mg tablet         Test Results Pending At Discharge  Pending Labs       No current pending labs.            Hospital Course   32-year-old  female without prior history of seizure activity.  Brought in for evaluation of witnessed seizure event by family.  No prior history of seizure activity.  States that she will occasionally stare offas a child but had no formal evaluation.   notes that she has had episodes of nightmares going back 5 years but only occurred at night.  1 year ago patient fell while having an episode of staring off to space.  On this occasion patient was brought in with generalized convulsions.  Brought in by EMS.  Second witnessed events in the ED with generalized tonic-clonic movements as well as tongue biting.  No recent history.  Placed on Keppra.  MRI done without unusual findings.  EEG done without significant findings.  Will discharge on Keppra.  Follow-up with neurology in 4 weeks.  Patient will need to establish with a new PCP.    Seizure (CMS/HCC)  No acute process of imaging  No grossly evident source to lead to the  above, but concern remains for an underlying seizure disorder given her prior history description   s/p 3gm keppra in the ED   Discussed with neurology whom recommend continuing keppra 1000mg po bid   Telemetry Monitoring  Seizure Precautions  EEG = pending  MR Brain w/o IV Contrast = pending  MR Angio Head/Neck w/o IV Contrast = pending  Neurology Consult appreciated     MRI and EEG unremarkable.  Started Keppra.  No driving 6 months.  Discuss with her employer what she is permitted to do at her workplace.  Follow up with neurology as outpatient.    Hyperglycemia  Glucose = 129  Suspect in setting of seizure   Will check Hgb A1c and repeat glucose with AM labs    High serum lactate  Lactate = 5.9 --> 11.5 --> 0.7 -->  Will continue with gentle fluids overnight and reassess in the morning     Class 3 severe obesity without serious comorbidity with body mass index (BMI) of 45.0 to 49.9 in adult (CMS/[facility])  BMI = 45.4    Seizure (CMS/[facility])  New-onset convulsive seizure activity x3 10/1/23  With prior suspected reported staring episodes since a child  Discussed possible new diagnosis of underlying seizure disorder (possibly generalized)  MRI brain normal     Pertinent Physical Exam At Time of Discharge  Physical Exam  Constitutional:       Appearance: Normal appearance. She is normal weight.   HENT:      Head: Normocephalic and atraumatic.      Nose: Nose normal. No congestion or rhinorrhea.      Mouth/Throat:      Mouth: Mucous membranes are dry.      Pharynx: Oropharynx is clear.   Eyes:      General: No scleral icterus.     Extraocular Movements: Extraocular movements intact.      Conjunctiva/sclera: Conjunctivae normal.      Pupils: Pupils are equal, round, and reactive to light.   Cardiovascular:      Rate and Rhythm: Normal rate and regular rhythm.      Pulses: Normal pulses.      Heart sounds: No murmur heard.     No gallop.   Pulmonary:      Effort: Pulmonary effort is normal.      Breath sounds: Normal breath  sounds. No wheezing, rhonchi or rales.   Chest:      Chest wall: No tenderness.   Abdominal:      General: Abdomen is flat. There is no distension.      Palpations: Abdomen is soft.      Tenderness: There is no abdominal tenderness. There is no guarding or rebound.   Musculoskeletal:         General: No swelling, tenderness or signs of injury. Normal range of motion.   Skin:     General: Skin is warm and dry.      Coloration: Skin is not jaundiced.      Findings: No bruising, erythema or rash.   Neurological:      General: No focal deficit present.      Mental Status: She is alert and oriented to person, place, and time.      Cranial Nerves: No cranial nerve deficit.      Sensory: No sensory deficit.   Psychiatric:         Mood and Affect: Mood normal.         Behavior: Behavior normal.         Outpatient Follow-Up  No future appointments.      Troy Griggs MD

## 2023-10-02 NOTE — DISCHARGE INSTRUCTIONS
Follow up with PCP in 1-2 weeks. Call to schedule. Bring all your discharge paperwork and medications when you go to your follow up appointment. Return to ED if your symptoms come back.    No driving x6 months.  Follow-up with neurology in 4 weeks.

## 2023-10-02 NOTE — ASSESSMENT & PLAN NOTE
No acute process of imaging  No grossly evident source to lead to the above, but concern remains for an underlying seizure disorder given her prior history description   s/p 3gm keppra in the ED   Discussed with neurology whom recommend continuing keppra 1000mg po bid   Telemetry Monitoring  Seizure Precautions  EEG = pending  MR Brain w/o IV Contrast = pending  MR Angio Head/Neck w/o IV Contrast = pending  Neurology Consult appreciated     MRI and EEG unremarkable.  Started Keppra.  No driving 6 months.  Discuss with her employer what she is permitted to do at her workplace.  Follow up with neurology as outpatient.

## 2023-10-02 NOTE — CARE PLAN
the patient's goals for the shift include No seizure activity.    The clinical goals for the shift include No seizure activity during hospital stay    Over the shift, the patient did not make progress toward the following goals. Barriers to progression include . Recommendations to address these barriers include .

## 2023-10-03 ENCOUNTER — TELEPHONE (OUTPATIENT)
Dept: HOME HEALTH SERVICES | Age: 33
End: 2023-10-03
Payer: COMMERCIAL

## 2023-10-04 ENCOUNTER — DOCUMENTATION (OUTPATIENT)
Dept: HOME HEALTH SERVICES | Age: 33
End: 2023-10-04
Payer: COMMERCIAL

## 2023-10-30 ENCOUNTER — PHARMACY VISIT (OUTPATIENT)
Dept: PHARMACY | Facility: CLINIC | Age: 33
End: 2023-10-30
Payer: COMMERCIAL

## 2023-10-30 PROCEDURE — RXMED WILLOW AMBULATORY MEDICATION CHARGE

## 2023-11-29 ENCOUNTER — PHARMACY VISIT (OUTPATIENT)
Dept: PHARMACY | Facility: CLINIC | Age: 33
End: 2023-11-29
Payer: COMMERCIAL

## 2023-11-29 PROCEDURE — RXMED WILLOW AMBULATORY MEDICATION CHARGE

## 2023-12-30 ENCOUNTER — PHARMACY VISIT (OUTPATIENT)
Dept: PHARMACY | Facility: CLINIC | Age: 33
End: 2023-12-30
Payer: COMMERCIAL

## 2023-12-30 PROCEDURE — RXMED WILLOW AMBULATORY MEDICATION CHARGE

## 2024-01-27 ENCOUNTER — PHARMACY VISIT (OUTPATIENT)
Dept: PHARMACY | Facility: CLINIC | Age: 34
End: 2024-01-27
Payer: COMMERCIAL

## 2024-01-27 PROCEDURE — RXMED WILLOW AMBULATORY MEDICATION CHARGE

## 2024-02-27 PROCEDURE — RXMED WILLOW AMBULATORY MEDICATION CHARGE

## 2024-02-29 ENCOUNTER — PHARMACY VISIT (OUTPATIENT)
Dept: PHARMACY | Facility: CLINIC | Age: 34
End: 2024-02-29
Payer: COMMERCIAL

## 2024-03-23 DIAGNOSIS — R56.9 SEIZURE (MULTI): ICD-10-CM

## 2024-03-23 RX ORDER — LEVETIRACETAM 750 MG/1
750 TABLET ORAL 2 TIMES DAILY
Qty: 60 TABLET | Refills: 5 | Status: SHIPPED | OUTPATIENT
Start: 2024-03-23

## 2024-03-24 PROCEDURE — RXMED WILLOW AMBULATORY MEDICATION CHARGE

## 2024-03-27 ENCOUNTER — PHARMACY VISIT (OUTPATIENT)
Dept: PHARMACY | Facility: CLINIC | Age: 34
End: 2024-03-27
Payer: COMMERCIAL